# Patient Record
Sex: MALE
[De-identification: names, ages, dates, MRNs, and addresses within clinical notes are randomized per-mention and may not be internally consistent; named-entity substitution may affect disease eponyms.]

---

## 2019-10-23 ENCOUNTER — HOSPITAL ENCOUNTER (OUTPATIENT)
Dept: HOSPITAL 25 - OREAST | Age: 69
Discharge: HOME | End: 2019-10-23
Attending: ORTHOPAEDIC SURGERY
Payer: COMMERCIAL

## 2019-10-23 VITALS — DIASTOLIC BLOOD PRESSURE: 95 MMHG | SYSTOLIC BLOOD PRESSURE: 112 MMHG

## 2019-10-23 DIAGNOSIS — D18.01: Primary | ICD-10-CM

## 2019-10-23 DIAGNOSIS — Z85.46: ICD-10-CM

## 2019-10-23 DIAGNOSIS — I10: ICD-10-CM

## 2019-10-23 DIAGNOSIS — Z87.891: ICD-10-CM

## 2019-10-23 PROCEDURE — 88304 TISSUE EXAM BY PATHOLOGIST: CPT

## 2019-10-23 PROCEDURE — 88342 IMHCHEM/IMCYTCHM 1ST ANTB: CPT

## 2019-10-23 PROCEDURE — 88341 IMHCHEM/IMCYTCHM EA ADD ANTB: CPT

## 2019-10-23 NOTE — OP
DATE OF OPERATION:  10/23/19 - [facility]

 

DATE OF BIRTH:  [birth year]

 

SURGEON:  Harshal Myers MD

 

ASSISTANT:  LILY Vang

 

ANESTHESIOLOGIST:  Dr. Brower.

 

ANESTHESIA:  Fanshawe block.

 

PRE-OP DIAGNOSIS:  Left hand deep indeterminate soft tissue mass.

 

POST-OP DIAGNOSIS:  Left hand deep indeterminate soft tissue mass.

 

OPERATIVE PROCEDURE:  Excision of left hand deep soft tissue mass.

 

INDICATIONS:  Mr. Tan is 69.  He has developed a very symptomatic mass.  I got 
an MRI; it showed a mass just sitting on the palmar aspect of the second 
metacarpal shaft just deep to the adductor pollicis muscle.  I had talked to 
him about this. I told him that it is likely benign.  It looks like the mass is 
in continuity with some sort of longitudinal structure.  I told him I am not 
aware of any nerve that is in that region, so this is likely a blood vessel 
that is in continuity with.  He understands and he wishes to proceed with 
surgery.

 

ESTIMATED BLOOD LOSS:  2 mL.

 

COMPLICATIONS:  None.

 

FINDINGS:  See above and below.

 

DESCRIPTION OF PROCEDURE:  Mr. Tan was seen in the preoperative holding area.  
The correct site, side, and procedure were identified.  We came back to the 
operating room where a forearm tourniquet was placed and a Chaz block was done.
  The arm was prepped and draped in the usual fashion and a time-out was 
performed.

 

I made an incision starting near the palmar aspect of the MCP joint, coming 
radial and then coming back, in one of the palmar creases, dissection was 
carried down. The radial digital nerve was identified.  The ulnar digital nerve 
was visualized, but the soft tissue plane between the nerve and the tendon was 
not disturbed.  The A1 pulley was visualized.  The end of the tendon sheath was 
visualized.  The soft tissue overlying the tendons proximally was released.  
The tendon was retracted out of the way ulnarly.  The mass was palpable just 
deep to the adductor pollicis muscle.  I placed an [employer] and was able to 
retract the muscle proximally.  I then released some soft tissue and little bit 
of fatty tissue around the mass and then there was a vessel that had a very 
large clotted off segment about 7 or 8 mm in length in continuity with this.  I 
tied off the structure distally with a 4-0 silk tie and then I cut the vessel 
just proximal to that.  There was a nice lumen. It was clearly a vessel.  I 
then came to the other side of the mass and I tied it off with a 4-0 silk tie 
and then I cut that structure just distal to that silk tie and then I was able 
to hand the mass off as a specimen.  There was a bit of reactive bone just deep 
to that, but clearly that was the mass and it was now excised.  I had confirmed 
that right where the mass was about 2 cm proximal to the apex of the palmar 
aspect of the metacarpal head and that was right where it was. At this point, I 
felt pretty confident that we had fully excised the mass. Everything was 
looking good.  There was nothing but clean healthy tissue that remained.  I 
irrigated out the wound.  I did not inject any surrounding local anesthetic 
just to follow good tumor principles.  I did just drizzle 2 to 3 mL of 0.25% 
Marcaine right on into the wound.  The skin was then closed with 4-0 nylon 
suture.  A soft dressing was applied and he was taken to the recovery room in 
stable condition.

 

 [id number]/CPS #: [id number]

MAYNOR